# Patient Record
Sex: MALE
[De-identification: names, ages, dates, MRNs, and addresses within clinical notes are randomized per-mention and may not be internally consistent; named-entity substitution may affect disease eponyms.]

---

## 2020-08-23 ENCOUNTER — HOSPITAL ENCOUNTER (EMERGENCY)
Dept: HOSPITAL 41 - JD.ED | Age: 20
Discharge: HOME | End: 2020-08-23
Payer: MEDICAID

## 2020-08-23 DIAGNOSIS — Z23: ICD-10-CM

## 2020-08-23 DIAGNOSIS — S91.332A: Primary | ICD-10-CM
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
## 2020-08-23 NOTE — EDM.PDOC
ED HPI GENERAL MEDICAL PROBLEM





- General


Chief Complaint: Lower Extremity Injury/Pain


Stated Complaint: LEFT FOOT STEPED ON SWATHI NAIL NEEDS TETNUS SHOT


Time Seen by Provider: 08/23/20 15:47


Source of Information: Reports: Patient


History Limitations: Reports: No Limitations





- History of Present Illness


INITIAL COMMENTS - FREE TEXT/NARRATIVE: 


Patient is a 20-year-old male who presents to the emergency department with 

complaints of stepping on a swathi nail prior to coming to ER.  States he was at 

work and a nail to hold a palate together went through his shoe.  He estimates t

hat it went in about 1 inch.  He is unsure when his last tetanus vaccination 

was.





- Related Data


                                    Allergies











Allergy/AdvReac Type Severity Reaction Status Date / Time


 


No Known Allergies Allergy   Verified 08/23/20 15:47











Home Meds: 


                                    Home Meds





Albuterol Sulfate [Albuterol Sulfate Hfa] 2 inh INH ASDIRECTED PRN 08/23/20 

[History]











Past Medical History





- Past Health History


Medical/Surgical History: Denies Medical/Surgical History





Social & Family History





- Tobacco Use


Smoking Status *Q: Never Smoker


Second Hand Smoke Exposure: No





- Caffeine Use


Caffeine Use: Reports: None





- Recreational Drug Use


Recreational Drug Use: No





Review of Systems





- Review of Systems


Review Of Systems: Comprehensive ROS is negative, except as noted in HPI.





ED EXAM, GENERAL





- Physical Exam


Exam: See Below


Exam Limited By: No Limitations


General Appearance: Alert, WD/WN, No Apparent Distress


Respiratory/Chest: No Respiratory Distress, Lungs Clear, Normal Breath Sounds, 

No Accessory Muscle Use, Chest Non-Tender


Cardiovascular: Normal Peripheral Pulses, Regular Rate, Rhythm, No Edema, No 

Gallop, No JVD, No Murmur, No Rub


Skin Exam: Other (Pinpoint puncture wound to the ventral aspect of the left 

foot.  No active bleeding.)





Course





- Vital Signs


Last Recorded V/S: 





                                Last Vital Signs











Temp  98.5 F   08/23/20 15:46


 


Pulse  81   08/23/20 15:46


 


Resp  18   08/23/20 15:46


 


BP  112/79   08/23/20 15:46


 


Pulse Ox  98   08/23/20 15:46














- Orders/Labs/Meds


Orders: 





                               Active Orders 24 hr











 Category Date Time Status


 


 Vaccines to be Administered [RC] PER UNIT ROUTINE Care  08/23/20 15:52 Active











Meds: 





Medications














Discontinued Medications














Generic Name Dose Route Start Last Admin





  Trade Name Zachary  PRN Reason Stop Dose Admin


 


Diphtheria/Tetanus/Acell Pertussis  0.5 ml  08/23/20 15:52  08/23/20 16:21





  Adacel  IM  08/23/20 15:53  0.5 ml





  .ONCE ONE   Administration


 


Lidocaine HCl  10 ml  08/23/20 15:52 





  Xylocaine 1%  INJECT  08/23/20 15:53 





  ONETIME ONE  














- Re-Assessments/Exams


Free Text/Narrative Re-Assessment/Exam: 





Patient is a 20-year-old male who presents to the ER after stepping on a swathi 

nail at work.  There is a pinpoint puncture wound to the distal aspect of the 

left ventral foot.  No active bleeding.  Tetanus vaccination was updated today. 

 Discussed signs of infection and when to seek follow-up care.  Discharge 

instructions as documented.








Departure





- Departure


Time of Disposition: 16:51


Disposition: Home, Self-Care 01


Condition: Good


Clinical Impression: 


 Puncture wound of skin from metal nail








- Discharge Information


*PRESCRIPTION DRUG MONITORING PROGRAM REVIEWED*: No


*COPY OF PRESCRIPTION DRUG MONITORING REPORT IN PATIENT JOSIAH: No


Referrals: 


PCP,None [Primary Care Provider] - 


Additional Instructions: 


You were seen in the emergency department today for puncture wound to your left 

foot after stepping on a nail at work.  Your tetanus vaccination was updated 

today and is valid for 10 years.  Keep the wound clean and dry.  Wash with 

normal soap and water twice daily.  Watch for signs of infection including 

increased redness, swelling, or purulent drainage.  If these should occur, you 

should be seen either in the clinic or in the emergency department as antibiotic

treatment may be needed.  Return to the ER as needed.





Sepsis Event Note (ED)





- Evaluation


Sepsis Screening Result: No Definite Risk





- Focused Exam


Vital Signs: 





                                   Vital Signs











  Temp Pulse Resp BP Pulse Ox


 


 08/23/20 15:46  98.5 F  81  18  112/79  98














- My Orders


Last 24 Hours: 





My Active Orders





08/23/20 15:52


Vaccines to be Administered [RC] PER UNIT ROUTINE 














- Assessment/Plan


Last 24 Hours: 





My Active Orders





08/23/20 15:52


Vaccines to be Administered [RC] PER UNIT ROUTINE

## 2021-02-18 ENCOUNTER — HOSPITAL ENCOUNTER (EMERGENCY)
Dept: HOSPITAL 41 - JD.ED | Age: 21
Discharge: HOME | End: 2021-02-18
Payer: COMMERCIAL
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
## 2021-02-18 DIAGNOSIS — S05.91XA: Primary | ICD-10-CM
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
## 2021-02-18 DIAGNOSIS — J45.909: ICD-10-CM

## 2021-02-18 NOTE — EDM.PDOC
ED HPI GENERAL MEDICAL PROBLEM





- General


Chief Complaint: ENT Problem


Stated Complaint: PROPANE SPRAYED IN EYE


Time Seen by Provider: 02/18/21 19:27


Source of Information: Reports: Patient


History Limitations: Reports: No Limitations





- History of Present Illness


INITIAL COMMENTS - FREE TEXT/NARRATIVE: 





Mr. Garrison is a very pleasant 20-year-old gentleman who now presents the ED after

propane was accidentally sprayed into his right eye while at work.  The patient 

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 

went into his right eye.  He was not wearing glasses or goggles.  He states that

he initially felt a burning sensation to the skin around his eye, although no 

pain to his eye itself.  He states that he went inside, told his boss what had 

happened, and was washing his right eye in an eye  in less than a 

minute.  He states that he irrigated his right eye for about 5 minutes before 

coming to the ED.





Here in the ED, the patient was found to be hemodynamically stable, afebrile, 

saturating 97% on room air.





At this time, the patient denies having any right eye discomfort or visual 

changes whatsoever.





Other than tonight's right eye injury, the patient denies having a recent fever,

chills, sore throat, ear pain, nasal or sinus congestion, cough, dyspnea, chest 

pain, palpitations, nausea, vomiting, constipation, diarrhea, abdominal pain, 

urinary symptoms, recent weight gain or weight loss, recent bloody bowel 

movements or black bowel movements, recent joint aches, headaches, or rashes.








The patient does not have a PCP.


He has not received an influenza vaccine this season, and declined an offer to 

get one here in the ED.








  ** Right Eye


Pain Score (Numeric/FACES): 4





- Related Data


                                    Allergies











Allergy/AdvReac Type Severity Reaction Status Date / Time


 


No Known Allergies Allergy   Verified 02/18/21 18:12











Home Meds: 


                                    Home Meds





Albuterol Sulfate [Albuterol Sulfate Hfa] 2 inh INH ASDIRECTED PRN 08/23/20 

[History]











Past Medical History


Respiratory History: Reports: Asthma (suspeccted, not tested)





- Past Surgical History


Male  Surgical History: Reports: Circumcision





Social & Family History





- Tobacco Use


Tobacco Use Status *Q: Never Tobacco User





- Caffeine Use


Caffeine Use: Reports: Coffee





- Alcohol Use


Alcohol Use History: Yes


Alcohol Use Frequency: Socially





- Recreational Drug Use


Recreational Drug Use: Yes


Drug Use in Last 12 Months: No


Recreational Drug Type: Reports: Marijuana/Hashish (last smoked 2019)





- Living Situation & Occupation


Living situation: Reports: Single, with Family


Occupation: Employed ([employer])





ED ROS GENERAL





- Review of Systems


Review Of Systems: Comprehensive ROS is negative, except as noted in HPI.





ED EXAM GENERAL W FULL EYE





- Physical Exam


Exam: See Below


Exam Limited By: No Limitations


General Appearance: Alert, WD/WN, No Apparent Distress


Eyelids: Bilateral: Normal Appearance


Conjunctiva & Sclera: Bilateral: Normal Appearance


Cornea Exam: Bilateral: Normal Appearance


Extraocular Movements: Bilateral: Intact


Pupils: Normal Accommodation


Pupillary Size: Bilateral: 5 mm


Pupillary Reaction: Bilateral: Brisk


Anterior Chamber: Bilateral: Normal Appearance





Course





- Vital Signs


Last Recorded V/S: 


                                Last Vital Signs











Temp  37.2 C   02/18/21 18:08


 


Pulse  77   02/18/21 18:08


 


Resp  16   02/18/21 18:08


 


BP  123/68   02/18/21 18:08


 


Pulse Ox  97   02/18/21 18:08














- Re-Assessments/Exams


Free Text/Narrative Re-Assessment/Exam: 





02/18/21 20:11


As above, the patient suffered momentary exposure of propane to his right eye, 

but quickly used an eyewash for about 5 minutes, and while he had some burning 

sensation to the skin around his eye when he initially arrived to the ED, he 

denies having eye pain itself, and since that time, his symptoms have resolved 

completely.  He states that his right sclera was initially injected, however, 

now it is not; his right eye looks the same as his left.  No further treatment 

is necessary.











Departure





- Departure


Time of Disposition: 20:12


Disposition: Home, Self-Care 01


Condition: Good


Clinical Impression: 


 Chemical injury of right eye








- Discharge Information


*PRESCRIPTION DRUG MONITORING PROGRAM REVIEWED*: Not Applicable


*COPY OF PRESCRIPTION DRUG MONITORING REPORT IN PATIENT JOSIAH: Not Applicable


Instructions:  Chemical Burn of the Eyes, Adult


Referrals: 


PCP,None [Primary Care Provider] - 


Forms:  ED Department Discharge


Additional Instructions: 


You were seen in the emergency room after propane sprayed into your right eye, 

while at work.





On examination, no injury to your right eye was found.





No further treatment is necessary.  You may resume your usual activities.





If any other problems, please do not hesitate to return to the ER.





Sepsis Event Note (ED)





- Evaluation


Sepsis Screening Result: No Definite Risk





- Focused Exam


Vital Signs: 


                                   Vital Signs











  Temp Pulse Resp BP Pulse Ox


 


 02/18/21 18:08  37.2 C  77  16  123/68  97